# Patient Record
Sex: FEMALE | Race: WHITE | NOT HISPANIC OR LATINO
[De-identification: names, ages, dates, MRNs, and addresses within clinical notes are randomized per-mention and may not be internally consistent; named-entity substitution may affect disease eponyms.]

---

## 2021-04-01 ENCOUNTER — APPOINTMENT (OUTPATIENT)
Dept: DISASTER EMERGENCY | Facility: OTHER | Age: 61
End: 2021-04-01
Payer: COMMERCIAL

## 2021-04-01 PROCEDURE — 0001A: CPT

## 2021-04-26 ENCOUNTER — APPOINTMENT (OUTPATIENT)
Dept: DISASTER EMERGENCY | Facility: OTHER | Age: 61
End: 2021-04-26
Payer: COMMERCIAL

## 2021-04-26 PROCEDURE — 0002A: CPT

## 2021-06-15 ENCOUNTER — EMERGENCY (EMERGENCY)
Facility: HOSPITAL | Age: 61
LOS: 1 days | Discharge: HOME | End: 2021-06-15
Attending: EMERGENCY MEDICINE
Payer: COMMERCIAL

## 2021-06-15 VITALS
OXYGEN SATURATION: 97 % | WEIGHT: 130.07 LBS | HEART RATE: 63 BPM | TEMPERATURE: 98 F | SYSTOLIC BLOOD PRESSURE: 149 MMHG | HEIGHT: 62 IN | RESPIRATION RATE: 20 BRPM | DIASTOLIC BLOOD PRESSURE: 93 MMHG

## 2021-06-15 VITALS
OXYGEN SATURATION: 100 % | TEMPERATURE: 98 F | SYSTOLIC BLOOD PRESSURE: 153 MMHG | DIASTOLIC BLOOD PRESSURE: 87 MMHG | HEART RATE: 64 BPM | RESPIRATION RATE: 19 BRPM

## 2021-06-15 DIAGNOSIS — Z98.89 OTHER SPECIFIED POSTPROCEDURAL STATES: Chronic | ICD-10-CM

## 2021-06-15 PROCEDURE — 73502 X-RAY EXAM HIP UNI 2-3 VIEWS: CPT

## 2021-06-15 PROCEDURE — 73502 X-RAY EXAM HIP UNI 2-3 VIEWS: CPT | Mod: 26,LT

## 2021-06-15 PROCEDURE — 99284 EMERGENCY DEPT VISIT MOD MDM: CPT | Mod: 25

## 2021-06-15 PROCEDURE — 71045 X-RAY EXAM CHEST 1 VIEW: CPT

## 2021-06-15 PROCEDURE — 99053 MED SERV 10PM-8AM 24 HR FAC: CPT

## 2021-06-15 PROCEDURE — 93005 ELECTROCARDIOGRAM TRACING: CPT

## 2021-06-15 PROCEDURE — 73090 X-RAY EXAM OF FOREARM: CPT | Mod: 26,LT

## 2021-06-15 PROCEDURE — 73090 X-RAY EXAM OF FOREARM: CPT

## 2021-06-15 PROCEDURE — 99284 EMERGENCY DEPT VISIT MOD MDM: CPT

## 2021-06-15 PROCEDURE — 71250 CT THORAX DX C-: CPT | Mod: 26,MA

## 2021-06-15 PROCEDURE — 71250 CT THORAX DX C-: CPT

## 2021-06-15 PROCEDURE — 71045 X-RAY EXAM CHEST 1 VIEW: CPT | Mod: 26

## 2021-06-15 RX ORDER — IBUPROFEN 200 MG
600 TABLET ORAL ONCE
Refills: 0 | Status: COMPLETED | OUTPATIENT
Start: 2021-06-15 | End: 2021-06-15

## 2021-06-15 RX ORDER — ACETAMINOPHEN 500 MG
975 TABLET ORAL ONCE
Refills: 0 | Status: COMPLETED | OUTPATIENT
Start: 2021-06-15 | End: 2021-06-15

## 2021-06-15 RX ADMIN — Medication 975 MILLIGRAM(S): at 09:33

## 2021-06-15 NOTE — ED PROVIDER NOTE - CLINICAL SUMMARY MEDICAL DECISION MAKING FREE TEXT BOX
keri -60 yo f  fell w horse this am no loc head of horse fell onto chest no sob pos pain l chest wall -mild pleurisy - gcs 15 neck supple pt got up and rode for another hr now co cp and l buttock pain sats 100 on ra no crep or sq emph, ttp ant rib 3-7 no abd ttp- bs bilateral-- pelv stable ttp bottock pos on l  no short or rot of lle - no pain w log roll -- nvi --image, analgesia , incentive spirometer - ct chest as pt has underlying lung dz -- r/o mult rib fx and occult ptx and likely dc

## 2021-06-15 NOTE — ED ADULT NURSE NOTE - OBJECTIVE STATEMENT
62 yo female with presents to the ED from horse track c/p left chest wall pain and left buttock pain radiating down leg leg after horse fell on top of patient at 0530 this AM. patient took 400mg Motrin this at 0430. patient states the horse got startled and fell onto her left side. patient denies hitting head or LOC. denies a/c use. patient is AAOx3. PERRL. speech is clear. moving all extremities with equal and strength and sensation bilaterally. patient gait is steady. lung sounds CTA bilaterally. +left sided chest wall tenderness on palpation. hematoma noted to left forearm. abdomen is soft, nontender, nondistended. full ROM to all extremities. +left wall chest pain on ROM left arm. VSS. MD at the bedside. denies SOB, fevers ,chills, HA, dizziness, changes in vision, numbness and tingling, abdominal pain, back pain, neck pain/ cervical tenderness.

## 2021-06-15 NOTE — ED PROVIDER NOTE - MUSCULOSKELETAL, MLM
L ribs 3-7 TTP, L gluteus TTP, range of motion is not limited, no muscle or joint tenderness strength 5/5 throughout UE/LE

## 2021-06-15 NOTE — ED PROVIDER NOTE - NSFOLLOWUPINSTRUCTIONS_ED_ALL_ED_FT
You were seen today for chest and hip pain following your accident. We performed imaging including forearm and hip xray and CT scan of the chest. The results are within this packet.     There were no fractures seen, however sometimes small fractures are not visible on imaging and some may exist. Please refrain from vigorous exercise or heavy lifting while pain persists and until reevaluated by another medical professional.     Please see your primary care doctor within 1-2 weeks    Take 500mg acetaminophen every 6-8 hours as needed  Take 400mg ibuprofen every 6-8 hours as needed, take with food    While pain persists and at least for the next week, please use the incentive spirometer every as instructed every 30-60 minutes to prevent lung collapse known as "atelectasis".

## 2021-06-15 NOTE — ED ADULT TRIAGE NOTE - CHIEF COMPLAINT QUOTE
states "horse fell on me at 0530 this AM"   c/o Left hip and Left chest pain states "horse fell on me at 0530 this AM"   c/o Left hip and Left rib pain

## 2021-06-15 NOTE — ED PROVIDER NOTE - PROGRESS NOTE DETAILS
Merly Rodas MD PGY-3 TAMI note: 60 yo F with hx asthma presents with L chest wall tenderness after injury from horse this AM. Also endorses L buttox pain. On exam, has chest wall tenderness over rib 3-7. Full ROM of hips, TTP of L buttox. Also noted to have ecchymosis and edema over L forearm, minimal tenderness. Is currently ambulatory. Will get CT chest to eval for occult PTX vs rib fractures, xray forearm, will give incentive spirometry - currently not tachypneic, 100% RA Ramin Martinez, PGY-1: Pt reexamined at bedside, resting comfortably, vitals stable. Pt has persistent soreness but reports pain is improved, imaging negative for fracture or pneumo. Will d/c with incentive spirometer and advise pain meds pt joao;lexi in ed pain improving ct unremarkable w dc

## 2021-06-15 NOTE — ED PROVIDER NOTE - PATIENT PORTAL LINK FT
You can access the FollowMyHealth Patient Portal offered by [facility] by registering at the following website: [email or web address]. By joining [employer]’s FollowMyHealth portal, you will also be able to view your health information using other applications (apps) compatible with our system.

## 2021-06-15 NOTE — ED PROVIDER NOTE - CARDIAC, MLM
Tenderness to L chest wall ribs 3-7, Cardiac normal rate, regular rhythm.  Heart sounds S1, S2.  No murmurs, rubs or gallops.

## 2021-06-15 NOTE — ED PROVIDER NOTE - OBJECTIVE STATEMENT
62yo F h.o asthma presents to ED following fall from horse this morning w L chest pain and L hip/bottom pain. Pt denies head trauma or LOC, continued to ride after injury however pain progressed and decided to come to ER. Currently ambulatory. No blood thinners, denies HA or visual changes. No palpitations, difficulty breathing, or pleurisy.

## 2021-06-15 NOTE — ED ADULT NURSE NOTE - ALCOHOL PRE SCREEN (AUDIT - C)
Statement Selected Detail Level: Detailed Quality 138: Melanoma: Coordination Of Care: A treatment plan was communicated to the physicians providing continuing care within one month of diagnosis outlining: diagnosis, tumor thickness and a plan for surgery or alternate care. Quality 137: Melanoma: Continuity Of Care - Recall System: Patient information entered into a recall system that includes: target date for the next exam specified AND a process to follow up with patients regarding missed or unscheduled appointments Quality 226: Preventive Care And Screening: Tobacco Use: Screening And Cessation Intervention: Patient screened for tobacco use and is an ex/non-smoker Quality 130: Documentation Of Current Medications In The Medical Record: Current Medications Documented

## 2024-11-26 ENCOUNTER — OUTPATIENT (OUTPATIENT)
Dept: OUTPATIENT SERVICES | Facility: HOSPITAL | Age: 64
LOS: 1 days | End: 2024-11-26

## 2024-11-26 ENCOUNTER — APPOINTMENT (OUTPATIENT)
Age: 64
End: 2024-11-26

## 2024-11-26 VITALS
BODY MASS INDEX: 23.6 KG/M2 | SYSTOLIC BLOOD PRESSURE: 133 MMHG | HEIGHT: 61 IN | OXYGEN SATURATION: 97 % | HEART RATE: 70 BPM | WEIGHT: 125 LBS | RESPIRATION RATE: 14 BRPM | TEMPERATURE: 98 F | DIASTOLIC BLOOD PRESSURE: 80 MMHG

## 2024-11-26 DIAGNOSIS — J01.00 ACUTE MAXILLARY SINUSITIS, UNSPECIFIED: ICD-10-CM

## 2024-11-26 DIAGNOSIS — Z98.89 OTHER SPECIFIED POSTPROCEDURAL STATES: Chronic | ICD-10-CM

## 2024-11-26 PROBLEM — Z00.00 ENCOUNTER FOR PREVENTIVE HEALTH EXAMINATION: Status: ACTIVE | Noted: 2024-11-26

## 2024-11-26 PROCEDURE — 99202 OFFICE O/P NEW SF 15 MIN: CPT

## 2024-11-26 RX ORDER — AZITHROMYCIN 250 MG/1
250 TABLET, FILM COATED ORAL
Qty: 1 | Refills: 0 | Status: ACTIVE | COMMUNITY
Start: 2024-11-26 | End: 1900-01-01

## 2024-11-26 RX ORDER — FLUTICASONE PROPIONATE 50 UG/1
50 SPRAY, METERED NASAL TWICE DAILY
Qty: 1 | Refills: 0 | Status: ACTIVE | COMMUNITY
Start: 2024-11-26 | End: 1900-01-01

## 2024-12-20 DIAGNOSIS — J01.00 ACUTE MAXILLARY SINUSITIS, UNSPECIFIED: ICD-10-CM

## 2025-03-26 NOTE — ED PROVIDER NOTE - NS_EDPROVIDERDISPOUSERTYPE_ED_A_ED
Reviewed note and agree with assessment and POC.     Nayeli Dee DPT
Attending Attestation (For Attendings USE Only)...
[id number]